# Patient Record
Sex: FEMALE | Race: WHITE | URBAN - METROPOLITAN AREA
[De-identification: names, ages, dates, MRNs, and addresses within clinical notes are randomized per-mention and may not be internally consistent; named-entity substitution may affect disease eponyms.]

---

## 2021-11-11 ENCOUNTER — WEB ENCOUNTER (OUTPATIENT)
Dept: URBAN - METROPOLITAN AREA CLINIC 92 | Facility: CLINIC | Age: 41
End: 2021-11-11

## 2021-11-11 ENCOUNTER — LAB OUTSIDE AN ENCOUNTER (OUTPATIENT)
Dept: URBAN - METROPOLITAN AREA CLINIC 96 | Facility: CLINIC | Age: 41
End: 2021-11-11

## 2021-11-11 ENCOUNTER — WEB ENCOUNTER (OUTPATIENT)
Dept: URBAN - METROPOLITAN AREA CLINIC 50 | Facility: CLINIC | Age: 41
End: 2021-11-11

## 2021-11-11 ENCOUNTER — DASHBOARD ENCOUNTERS (OUTPATIENT)
Age: 41
End: 2021-11-11

## 2021-11-11 ENCOUNTER — OFFICE VISIT (OUTPATIENT)
Dept: URBAN - METROPOLITAN AREA CLINIC 50 | Facility: CLINIC | Age: 41
End: 2021-11-11
Payer: COMMERCIAL

## 2021-11-11 DIAGNOSIS — K62.5 RECTAL BLEEDING: ICD-10-CM

## 2021-11-11 DIAGNOSIS — R93.3 ABNORMAL CT SCAN, COLON: ICD-10-CM

## 2021-11-11 DIAGNOSIS — D72.829 LEUKOCYTOSIS, UNSPECIFIED TYPE: ICD-10-CM

## 2021-11-11 DIAGNOSIS — R10.84 GENERALIZED ABDOMINAL PAIN: ICD-10-CM

## 2021-11-11 PROBLEM — 168542003: Status: ACTIVE | Noted: 2021-11-11

## 2021-11-11 PROBLEM — 111583006: Status: ACTIVE | Noted: 2021-11-11

## 2021-11-11 PROCEDURE — 99204 OFFICE O/P NEW MOD 45 MIN: CPT | Performed by: INTERNAL MEDICINE

## 2021-11-11 PROCEDURE — 99244 OFF/OP CNSLTJ NEW/EST MOD 40: CPT | Performed by: INTERNAL MEDICINE

## 2021-11-11 RX ORDER — PANTOPRAZOLE SODIUM 40 MG/1
1 TABLET TABLET, DELAYED RELEASE ORAL ONCE A DAY
Qty: 14 TABLET | Refills: 0 | OUTPATIENT

## 2021-11-11 RX ORDER — DICYCLOMINE HYDROCHLORIDE 10 MG/1
1 CAPSULES CAPSULE ORAL
Qty: 30 | Refills: 1 | OUTPATIENT

## 2021-11-11 NOTE — HPI-TODAY'S VISIT:
The patient was referred by Dr. Vladimir Stroud for abd pain.   A copy of this document is being forwarded to the referring provider.  41 y.o. WF, , 3 children, works nights in [employer] This weekend got a massage - stomach started to hurt during it - lots of pain After massage, w/ pain, went home, had chills, nausea, sweats Then, started w/ blood from rectum - every 15-20 min - couldn't leave bathroom So, went to urgent care and then ER Diarrhea has since stopped But, terrible about of pain Bloated w/o bm since Stomach remains distended Hurts so bad Lots of stress right now -  now on disability

## 2021-11-12 ENCOUNTER — TELEPHONE ENCOUNTER (OUTPATIENT)
Dept: URBAN - METROPOLITAN AREA CLINIC 98 | Facility: CLINIC | Age: 41
End: 2021-11-12

## 2021-11-15 ENCOUNTER — TELEPHONE ENCOUNTER (OUTPATIENT)
Dept: URBAN - METROPOLITAN AREA CLINIC 92 | Facility: CLINIC | Age: 41
End: 2021-11-15